# Patient Record
Sex: FEMALE | Race: WHITE | URBAN - METROPOLITAN AREA
[De-identification: names, ages, dates, MRNs, and addresses within clinical notes are randomized per-mention and may not be internally consistent; named-entity substitution may affect disease eponyms.]

---

## 2017-03-06 ENCOUNTER — HISTORICAL (OUTPATIENT)
Dept: ADMINISTRATIVE | Facility: HOSPITAL | Age: 42
End: 2017-03-06

## 2019-09-10 ENCOUNTER — HISTORICAL (OUTPATIENT)
Dept: ADMINISTRATIVE | Facility: HOSPITAL | Age: 44
End: 2019-09-10

## 2019-09-10 LAB
ABS NEUT (OLG): 2.7 X10(3)/MCL (ref 2.1–9.2)
ALBUMIN SERPL-MCNC: 4.8 GM/DL (ref 3.4–5)
ALBUMIN/GLOB SERPL: 2.53 {RATIO} (ref 1.5–2.5)
ALP SERPL-CCNC: 40 UNIT/L (ref 38–126)
ALT SERPL-CCNC: 11 UNIT/L (ref 7–52)
AST SERPL-CCNC: 16 UNIT/L (ref 15–37)
BILIRUB SERPL-MCNC: 0.5 MG/DL (ref 0.2–1)
BILIRUBIN DIRECT+TOT PNL SERPL-MCNC: 0.1 MG/DL (ref 0–0.5)
BILIRUBIN DIRECT+TOT PNL SERPL-MCNC: 0.4 MG/DL
BUN SERPL-MCNC: 13 MG/DL (ref 7–18)
CALCIUM SERPL-MCNC: 9.6 MG/DL (ref 8.5–10)
CHLORIDE SERPL-SCNC: 104 MMOL/L (ref 98–107)
CHOLEST SERPL-MCNC: 281 MG/DL (ref 0–200)
CHOLEST/HDLC SERPL: 4.8 {RATIO}
CO2 SERPL-SCNC: 29 MMOL/L (ref 21–32)
CREAT SERPL-MCNC: 0.9 MG/DL (ref 0.6–1.3)
ERYTHROCYTE [DISTWIDTH] IN BLOOD BY AUTOMATED COUNT: 12.3 % (ref 11.5–17)
GLOBULIN SER-MCNC: 2 GM/DL (ref 1.2–3)
GLUCOSE SERPL-MCNC: 103 MG/DL (ref 74–106)
HCT VFR BLD AUTO: 39.2 % (ref 37–47)
HDLC SERPL-MCNC: 59 MG/DL (ref 35–60)
HGB BLD-MCNC: 13.3 GM/DL (ref 12–16)
LDLC SERPL CALC-MCNC: 181 MG/DL (ref 0–129)
LYMPHOCYTES # BLD AUTO: 1.7 X10(3)/MCL (ref 0.6–3.4)
LYMPHOCYTES NFR BLD AUTO: 34 % (ref 13–40)
MCH RBC QN AUTO: 31 PG (ref 27–31.2)
MCHC RBC AUTO-ENTMCNC: 34 GM/DL (ref 32–36)
MCV RBC AUTO: 91 FL (ref 80–94)
MONOCYTES # BLD AUTO: 0.6 X10(3)/MCL (ref 0.1–1.3)
MONOCYTES NFR BLD AUTO: 12.9 % (ref 0.1–24)
NEUTROPHILS NFR BLD AUTO: 53.1 % (ref 47–80)
PLATELET # BLD AUTO: 232 X10(3)/MCL (ref 130–400)
PMV BLD AUTO: 10 FL (ref 9.4–12.4)
POTASSIUM SERPL-SCNC: 4.6 MMOL/L (ref 3.5–5.1)
PROT SERPL-MCNC: 6.7 GM/DL (ref 6.4–8.2)
RBC # BLD AUTO: 4.29 X10(6)/MCL (ref 4.2–5.4)
SODIUM SERPL-SCNC: 140 MMOL/L (ref 136–145)
TRIGL SERPL-MCNC: 71 MG/DL (ref 30–150)
TSH SERPL-ACNC: 0.48 MIU/ML (ref 0.35–4.94)
VLDLC SERPL CALC-MCNC: 14.2 MG/DL
WBC # SPEC AUTO: 5 X10(3)/MCL (ref 4.5–11.5)

## 2022-04-09 ENCOUNTER — HISTORICAL (OUTPATIENT)
Dept: ADMINISTRATIVE | Facility: HOSPITAL | Age: 47
End: 2022-04-09

## 2022-04-29 VITALS
BODY MASS INDEX: 22.04 KG/M2 | HEIGHT: 67 IN | DIASTOLIC BLOOD PRESSURE: 68 MMHG | SYSTOLIC BLOOD PRESSURE: 104 MMHG | WEIGHT: 140.44 LBS

## 2022-05-02 NOTE — HISTORICAL [facility] CERNER
This is a historical note converted from [employer]. Formatting and pictures may have been removed.  Please reference [employer] for original formatting and attached multimedia. Chief Complaint  WELLNESS CPX FAST  History of Present Illness  43 year old WF presents for annual wellness  PMH: Anxiety  ?  , 2 kids, Homemaker  No Tob, EtOH: 1-2 glasses wine/week  Exercise: cardio 2x/week, yoga 2x/week  Diet: lean  ?  GYN (Dr Chavez)- PAP/MMG UTD  Review of Systems  Constitutional:?no fever, fatigue, weakness  Eye:?no vision loss, eye redness, drainage, or pain  ENMT:?no sore throat, ear pain, sinus pain/congestion, nasal congestion/drainage  Respiratory:?no cough, no wheezing, no shortness of breath  Cardiovascular:?no chest pain, no palpitations, no edema  Gastrointestinal:?no nausea, vomiting, or diarrhea. No abdominal pain  Genitourinary:?no dysuria, no urinary frequency or urgency, no hematuria  Hema/Lymph:?no abnormal bruising or bleeding  Endocrine:?no heat or cold intolerance, no excessive thirst or excessive urination  Musculoskeletal:?no muscle or joint pain, no joint swelling  Integumentary:?no skin rash or abnormal lesion  Neurologic: no headache, no dizziness, no weakness or numbness  ?  Physical Exam  Vitals & Measurements  T:?37? ?C (Oral)? HR:?64(Peripheral)? BP:?104/68?  HT:?170?cm? WT:?63.7?kg? BMI:?22.04?  General:?well-developed well-nourished in no acute distress  Eye: PERRLA, EOMI, clear conjunctiva, eyelids normal  HENT:?TMs/ear canals clear, oropharynx without erythema/exudate, oropharynx and nasal mucosal surfaces moist, no maxillary/frontal sinus tenderness to palpation  Neck: full range of motion, no thyromegaly or lymphadenopathy  Respiratory:?clear to auscultation bilaterally  Cardiovascular:?regular rate and rhythm without murmurs, gallops or rubs  Gastrointestinal:?soft, non-tender, non-distended with normal bowel sounds, without masses to palpation  Genitourinary: no CVA tenderness to  palpation  Musculoskeletal:?full range of motion of all extremities/spine without limitation or discomfort  Integumentary: no rashes or skin lesions present  Neurologic: cranial nerves intact, no signs of peripheral neurological deficit, motor/sensory function intact  ?  Assessment/Plan  1.?Wellness examination?Z00.00  ?LABS: CBC, CMP, TSH, FLP  Ordered:  CBC w/ Auto Diff, Routine collect, 09/10/19 10:55:00 CDT, Blood, Order for future visit, Stop date 09/10/19 10:55:00 CDT, Lab Collect, Wellness examination, 09/10/19 10:55:00 CDT  Comprehensive Metabolic Panel, Now collect, 09/10/19 10:55:00 CDT, Blood, Order for future visit, Stop date 09/10/19 10:55:00 CDT, Lab Collect, Wellness examination, 09/10/19 10:55:00 CDT  Lipid Panel, Routine collect, 09/10/19 10:55:00 CDT, Blood, Order for future visit, Stop date 09/10/19 10:55:00 CDT, Lab Collect, Wellness examination, 09/10/19 10:55:00 CDT  [facility] 40-64 [facility] [id number] PC, Wellness examination, [facility], 09/10/19 10:55:00 CDT  Thyroid Stimulating Hormone, Routine collect, 09/10/19 10:55:00 CDT, Blood, Order for future visit, Stop date 09/10/19 10:55:00 CDT, Lab Collect, Wellness examination, 09/10/19 10:55:00 CDT  ?  2.?Depression?F32.9  Continue Celexa 10mg PO q day  ?  Orders:  Lab Collection Request, 09/10/19 10:55:00 CDT, [facility], 09/10/19 10:55:00 CDT   Problem List/Past Medical History  Ongoing  Depression  Historical  Pregnant  Pregnant  Pregnant  Procedure/Surgical History   Section (None) (2017)  Extraction of Products of Conception, Low Cervical, Open Approach (2017)   Section (2016)  Extraction of Products of Conception, Low Cervical, Open Approach (2016)  Extraction of impacted wisdom tooth   Medications  citalopram 10 mg oral tablet, See Instructions  Allergies  No Known Allergies  Social History  Abuse/Neglect  No, 09/10/2019  Alcohol  Wine, 1-2 times per week,  06/23/2016  Employment/School  Unemployed, 03/08/2019  Exercise  Exercise duration: 60. Exercise frequency: 1-2 times/week. Exercise type: Aerobics, Yoga., 03/08/2019  Home/Environment  Lives with Children, Spouse., 03/08/2019  Nutrition/Health  Regular, 03/08/2019  Substance Use  Never, 06/23/2016  Tobacco  Never (less than 100 in lifetime), N/A, 09/10/2019  Family History  Depression.: Father.  Myeloma: Mother.  Immunizations  Vaccine Date Status Comments   influenza virus vaccine, inactivated 10/29/2018 Recorded    tetanus/diphtheria/pertussis, acel(Tdap) 09/20/2017 Given    influenza virus vaccine, inactivated - Not Given Patient Refuses     influenza virus vaccine, inactivated - Not Given Parent Or Guardian Refuses     tetanus/diphtheria/pertussis, acel(Tdap) 04/07/2016 Recorded    Health Maintenance  Health Maintenance  ???Pending?(in the next year)  ??? ??OverDue  ??? ? ? ?Alcohol Misuse Screening due??01/01/19??and every 1??year(s)  ??? ??Due?  ??? ? ? ?ADL Screening due??09/10/19??and every 1??year(s)  ??? ? ? ?Influenza Vaccine due??09/10/19??and every?  ??? ??Due In Future?  ??? ? ? ?Obesity Screening not due until??01/01/20??and every 1??year(s)  ??? ? ? ?Blood Pressure Screening not due until??09/09/20??and every 1??year(s)  ??? ? ? ?Body Mass Index Check not due until??09/09/20??and every 1??year(s)  ???Satisfied?(in the past 1 year)  ??? ??Satisfied?  ??? ? ? ?Blood Pressure Screening on??09/10/19.??Satisfied by Sandra Espinosa LPN  ??? ? ? ?Body Mass Index Check on??09/10/19.??Satisfied by Sandra Espinosa LPN  ??? ? ? ?Cervical Cancer Screening on??06/17/19.??Satisfied by Alex, Courtney  ??? ? ? ?Depression Screening on??09/10/19.??Satisfied by Sandra Espinosa LPN  ??? ? ? ?Influenza Vaccine on??10/29/18.??Satisfied by Dariana Rodrigues  ??? ? ? ?Obesity Screening on??09/10/19.??Satisfied by Sandra Espinosa LPN  ?      Patient condition discussed?in detail with nurse practitioner.? Agree with  plan of care?and follow-up.